# Patient Record
Sex: FEMALE | Race: WHITE | NOT HISPANIC OR LATINO | URBAN - METROPOLITAN AREA
[De-identification: names, ages, dates, MRNs, and addresses within clinical notes are randomized per-mention and may not be internally consistent; named-entity substitution may affect disease eponyms.]

---

## 2019-12-16 ENCOUNTER — OUTPATIENT (OUTPATIENT)
Dept: INPATIENT UNIT | Facility: HOSPITAL | Age: 29
LOS: 1 days | End: 2019-12-16
Payer: MEDICAID

## 2019-12-16 VITALS
SYSTOLIC BLOOD PRESSURE: 106 MMHG | DIASTOLIC BLOOD PRESSURE: 51 MMHG | TEMPERATURE: 99 F | RESPIRATION RATE: 18 BRPM | HEART RATE: 93 BPM

## 2019-12-16 VITALS — SYSTOLIC BLOOD PRESSURE: 115 MMHG | HEART RATE: 85 BPM | DIASTOLIC BLOOD PRESSURE: 63 MMHG

## 2019-12-16 DIAGNOSIS — O47.03 FALSE LABOR BEFORE 37 COMPLETED WEEKS OF GESTATION, THIRD TRIMESTER: ICD-10-CM

## 2019-12-16 LAB
APPEARANCE UR: CLEAR — SIGNIFICANT CHANGE UP
BILIRUB UR-MCNC: NEGATIVE — SIGNIFICANT CHANGE UP
COD CRY URNS QL: ABNORMAL
COLOR SPEC: YELLOW — SIGNIFICANT CHANGE UP
DIFF PNL FLD: NEGATIVE — SIGNIFICANT CHANGE UP
EPI CELLS # UR: SIGNIFICANT CHANGE UP
GLUCOSE UR QL: NEGATIVE MG/DL — SIGNIFICANT CHANGE UP
KETONES UR-MCNC: ABNORMAL
LEUKOCYTE ESTERASE UR-ACNC: ABNORMAL
NITRITE UR-MCNC: NEGATIVE — SIGNIFICANT CHANGE UP
PH UR: 6 — SIGNIFICANT CHANGE UP (ref 5–8)
PROT UR-MCNC: 30 MG/DL
RBC CASTS # UR COMP ASSIST: SIGNIFICANT CHANGE UP /HPF (ref 0–4)
SP GR SPEC: 1.02 — SIGNIFICANT CHANGE UP (ref 1.01–1.02)
UROBILINOGEN FLD QL: NEGATIVE MG/DL — SIGNIFICANT CHANGE UP
WBC UR QL: SIGNIFICANT CHANGE UP

## 2019-12-16 PROCEDURE — 87186 SC STD MICRODIL/AGAR DIL: CPT

## 2019-12-16 PROCEDURE — G0463: CPT

## 2019-12-16 PROCEDURE — 81001 URINALYSIS AUTO W/SCOPE: CPT

## 2019-12-16 PROCEDURE — 87086 URINE CULTURE/COLONY COUNT: CPT

## 2019-12-16 PROCEDURE — 59025 FETAL NON-STRESS TEST: CPT

## 2019-12-16 RX ORDER — SODIUM CHLORIDE 9 MG/ML
1000 INJECTION, SOLUTION INTRAVENOUS ONCE
Refills: 0 | Status: DISCONTINUED | OUTPATIENT
Start: 2019-12-16 | End: 2019-12-16

## 2019-12-16 NOTE — OB RN TRIAGE NOTE - NS_MATERNALCONCERNS_OBGYN_ALL_OB_FT
top x1 (2007)  asthma - inhaler prn - last used 6 mos ago  cyclic vomitting syndrome - took reglan daily until 24 wks gestation  marijuana use - 2-3x weekly

## 2019-12-16 NOTE — OB PROVIDER TRIAGE NOTE - HISTORY OF PRESENT ILLNESS
INDERJIT ANNE is a 29y  at 29w who presented to L&D because of some cramping for the past couple of days. She stated that she is here on vacation and has had frequent intercourse within the past few days. She stated after intercourse she had some light vaginal spotting and intermittent cramping. She denies any leakage of fluid and reports good fetal movement. This pregnancy has otherwise been uncomplicated.     obhx: top   pmhx: none  pshx: none  meds: pnv  Allergies: amoxicillin (Anaphylaxis), Cipro (Rash), Omnicef (Rash), penicillin (Anaphylaxis), Zofran (Vomiting)

## 2019-12-16 NOTE — OB PROVIDER TRIAGE NOTE - NSOBPROVIDERNOTE_OBGYN_ALL_OB_FT
DAYANA INDERJIT is a 29y  at 29w who presented to L&D for rule out  labor. Patient does not have regular contractions and is closed on vaginal exam. Urinalysis was clean. Due to frequent intercourse, patient did not have an FFN completed.    Plan  - f/u urinalysis- clean  - f/u outpatient, return to L&D if leakage of fluid, vaginal bleeding or decreased fetal movement    d/w [place]

## 2019-12-16 NOTE — OB PROVIDER TRIAGE NOTE - NSHPPHYSICALEXAM_GEN_ALL_CORE
Vital Signs Last 24 Hrs  T(C): 37 (16 Dec 2019 19:54), Max: 37.0 (16 Dec 2019 19:34)  T(F): 98.6 (16 Dec 2019 19:54), Max: 98.6 (16 Dec 2019 19:34)  HR: 93 (16 Dec 2019 19:54) (93 - 93)  BP: 106/51 (16 Dec 2019 19:54) (106/51 - 106/51)  RR: 18 (16 Dec 2019 19:54) (18 - 18)  SpO2: 100% (16 Dec 2019 19:54) (100% - 100%)  General: Alert and oriented x3, no acute distress  FHT: baseline 135bpm, moderate variability, +accels, -deccels  [place]: no contractions, mild uterine irritability  SVE: 0/0/-3  SSE: no active bleeding, normal vaginal discharge

## 2020-03-02 ENCOUNTER — OUTPATIENT (OUTPATIENT)
Dept: INPATIENT UNIT | Facility: HOSPITAL | Age: 30
LOS: 1 days | End: 2020-03-02
Payer: MEDICAID

## 2020-03-02 VITALS
RESPIRATION RATE: 16 BRPM | DIASTOLIC BLOOD PRESSURE: 59 MMHG | SYSTOLIC BLOOD PRESSURE: 103 MMHG | HEART RATE: 88 BPM | TEMPERATURE: 98 F

## 2020-03-02 VITALS — RESPIRATION RATE: 16 BRPM | TEMPERATURE: 98 F

## 2020-03-02 DIAGNOSIS — O47.1 FALSE LABOR AT OR AFTER 37 COMPLETED WEEKS OF GESTATION: ICD-10-CM

## 2020-03-02 DIAGNOSIS — Z98.890 OTHER SPECIFIED POSTPROCEDURAL STATES: Chronic | ICD-10-CM

## 2020-03-02 LAB
ALBUMIN SERPL ELPH-MCNC: 3.3 G/DL — SIGNIFICANT CHANGE UP (ref 3.3–5.2)
ALP SERPL-CCNC: 134 U/L — HIGH (ref 40–120)
ALT FLD-CCNC: 9 U/L — SIGNIFICANT CHANGE UP
AMPHET UR-MCNC: NEGATIVE — SIGNIFICANT CHANGE UP
ANION GAP SERPL CALC-SCNC: 15 MMOL/L — SIGNIFICANT CHANGE UP (ref 5–17)
APPEARANCE UR: CLEAR — SIGNIFICANT CHANGE UP
AST SERPL-CCNC: 16 U/L — SIGNIFICANT CHANGE UP
BACTERIA # UR AUTO: ABNORMAL
BARBITURATES UR SCN-MCNC: NEGATIVE — SIGNIFICANT CHANGE UP
BENZODIAZ UR-MCNC: NEGATIVE — SIGNIFICANT CHANGE UP
BILIRUB SERPL-MCNC: <0.2 MG/DL — LOW (ref 0.4–2)
BILIRUB UR-MCNC: NEGATIVE — SIGNIFICANT CHANGE UP
BLD GP AB SCN SERPL QL: SIGNIFICANT CHANGE UP
BUN SERPL-MCNC: 8 MG/DL — SIGNIFICANT CHANGE UP (ref 8–20)
CALCIUM SERPL-MCNC: 9 MG/DL — SIGNIFICANT CHANGE UP (ref 8.6–10.2)
CHLORIDE SERPL-SCNC: 100 MMOL/L — SIGNIFICANT CHANGE UP (ref 98–107)
CO2 SERPL-SCNC: 21 MMOL/L — LOW (ref 22–29)
COCAINE METAB.OTHER UR-MCNC: NEGATIVE — SIGNIFICANT CHANGE UP
COD CRY URNS QL: ABNORMAL
COLOR SPEC: YELLOW — SIGNIFICANT CHANGE UP
CREAT SERPL-MCNC: 0.82 MG/DL — SIGNIFICANT CHANGE UP (ref 0.5–1.3)
DIFF PNL FLD: ABNORMAL
EPI CELLS # UR: SIGNIFICANT CHANGE UP
GLUCOSE SERPL-MCNC: 80 MG/DL — SIGNIFICANT CHANGE UP (ref 70–99)
GLUCOSE UR QL: NEGATIVE MG/DL — SIGNIFICANT CHANGE UP
HCT VFR BLD CALC: 35.7 % — SIGNIFICANT CHANGE UP (ref 34.5–45)
HGB BLD-MCNC: 11.8 G/DL — SIGNIFICANT CHANGE UP (ref 11.5–15.5)
HIV 1 & 2 AB SERPL IA.RAPID: SIGNIFICANT CHANGE UP
KETONES UR-MCNC: ABNORMAL
LEUKOCYTE ESTERASE UR-ACNC: ABNORMAL
MCHC RBC-ENTMCNC: 27.7 PG — SIGNIFICANT CHANGE UP (ref 27–34)
MCHC RBC-ENTMCNC: 33.1 GM/DL — SIGNIFICANT CHANGE UP (ref 32–36)
MCV RBC AUTO: 83.8 FL — SIGNIFICANT CHANGE UP (ref 80–100)
METHADONE UR-MCNC: NEGATIVE — SIGNIFICANT CHANGE UP
NITRITE UR-MCNC: NEGATIVE — SIGNIFICANT CHANGE UP
OPIATES UR-MCNC: NEGATIVE — SIGNIFICANT CHANGE UP
PCP SPEC-MCNC: SIGNIFICANT CHANGE UP
PCP UR-MCNC: NEGATIVE — SIGNIFICANT CHANGE UP
PH UR: 6.5 — SIGNIFICANT CHANGE UP (ref 5–8)
PLATELET # BLD AUTO: 224 K/UL — SIGNIFICANT CHANGE UP (ref 150–400)
POTASSIUM SERPL-MCNC: 3.6 MMOL/L — SIGNIFICANT CHANGE UP (ref 3.5–5.3)
POTASSIUM SERPL-SCNC: 3.6 MMOL/L — SIGNIFICANT CHANGE UP (ref 3.5–5.3)
PROT SERPL-MCNC: 6.5 G/DL — LOW (ref 6.6–8.7)
PROT UR-MCNC: 30 MG/DL
RBC # BLD: 4.26 M/UL — SIGNIFICANT CHANGE UP (ref 3.8–5.2)
RBC # FLD: 15 % — HIGH (ref 10.3–14.5)
RBC CASTS # UR COMP ASSIST: SIGNIFICANT CHANGE UP /HPF (ref 0–4)
SODIUM SERPL-SCNC: 136 MMOL/L — SIGNIFICANT CHANGE UP (ref 135–145)
SP GR SPEC: 1.02 — SIGNIFICANT CHANGE UP (ref 1.01–1.02)
THC UR QL: POSITIVE
UROBILINOGEN FLD QL: 1 MG/DL
WBC # BLD: 11.27 K/UL — HIGH (ref 3.8–10.5)
WBC # FLD AUTO: 11.27 K/UL — HIGH (ref 3.8–10.5)
WBC UR QL: SIGNIFICANT CHANGE UP

## 2020-03-02 PROCEDURE — 86765 RUBEOLA ANTIBODY: CPT

## 2020-03-02 PROCEDURE — 81001 URINALYSIS AUTO W/SCOPE: CPT

## 2020-03-02 PROCEDURE — 87340 HEPATITIS B SURFACE AG IA: CPT

## 2020-03-02 PROCEDURE — 87389 HIV-1 AG W/HIV-1&-2 AB AG IA: CPT

## 2020-03-02 PROCEDURE — 87081 CULTURE SCREEN ONLY: CPT

## 2020-03-02 PROCEDURE — 86703 HIV-1/HIV-2 1 RESULT ANTBDY: CPT

## 2020-03-02 PROCEDURE — 86780 TREPONEMA PALLIDUM: CPT

## 2020-03-02 PROCEDURE — 80053 COMPREHEN METABOLIC PANEL: CPT

## 2020-03-02 PROCEDURE — 85027 COMPLETE CBC AUTOMATED: CPT

## 2020-03-02 PROCEDURE — 87591 N.GONORRHOEAE DNA AMP PROB: CPT

## 2020-03-02 PROCEDURE — 87800 DETECT AGNT MULT DNA DIREC: CPT

## 2020-03-02 PROCEDURE — 36415 COLL VENOUS BLD VENIPUNCTURE: CPT

## 2020-03-02 PROCEDURE — 86901 BLOOD TYPING SEROLOGIC RH(D): CPT

## 2020-03-02 PROCEDURE — 86850 RBC ANTIBODY SCREEN: CPT

## 2020-03-02 PROCEDURE — 80307 DRUG TEST PRSMV CHEM ANLYZR: CPT

## 2020-03-02 PROCEDURE — 59025 FETAL NON-STRESS TEST: CPT

## 2020-03-02 PROCEDURE — 86762 RUBELLA ANTIBODY: CPT

## 2020-03-02 PROCEDURE — 86900 BLOOD TYPING SEROLOGIC ABO: CPT

## 2020-03-02 PROCEDURE — 87491 CHLMYD TRACH DNA AMP PROBE: CPT

## 2020-03-02 PROCEDURE — G0463: CPT

## 2020-03-02 PROCEDURE — 76818 FETAL BIOPHYS PROFILE W/NST: CPT

## 2020-03-02 RX ORDER — SODIUM CHLORIDE 9 MG/ML
1000 INJECTION, SOLUTION INTRAVENOUS
Refills: 0 | Status: DISCONTINUED | OUTPATIENT
Start: 2020-03-02 | End: 2020-03-17

## 2020-03-02 RX ORDER — SODIUM CHLORIDE 9 MG/ML
1000 INJECTION, SOLUTION INTRAVENOUS ONCE
Refills: 0 | Status: DISCONTINUED | OUTPATIENT
Start: 2020-03-02 | End: 2020-03-17

## 2020-03-02 RX ORDER — NITROFURANTOIN MACROCRYSTAL 50 MG
1 CAPSULE ORAL
Qty: 14 | Refills: 0
Start: 2020-03-02

## 2020-03-02 NOTE — OB PROVIDER TRIAGE NOTE - NSHPPHYSICALEXAM_GEN_ALL_CORE
General: Alert and oriented x3, NAD  Abd: Soft, nontender, gravid  SVE:  SSE:    FHT: 160bpm - category I tracing  [place]: Irregular ctxs

## 2020-03-02 NOTE — OB RN TRIAGE NOTE - PMH
Asthma, unspecified asthma severity, unspecified whether complicated, unspecified whether persistent    Cyclical vomiting

## 2020-03-02 NOTE — OB PROVIDER TRIAGE NOTE - HISTORY OF PRESENT ILLNESS
Patient is a 29yo  at 40w by 1st trimester sono presenting today because "it is my due date", vaginal discharge with itching, "I haven't had prenatal care since December". Itching started a week ago, she didn't notice any discharge until yesterday. The discharge is cottage cheese-like, she thinks she has a yeast infection. Denies VB, LOF, dysuria, hematuria.    Prenatal course complicated by irregular PNC. Patient said she had regular PNC in [place] until December when she came up here to be with a terminally ill relative. She has continued to live up here, however she has not scheduled a prenatal visit with any providers.     Medhx: Asthma - last hospitalized at 7yo, last used inhaler a year ago, cyclic vomiting syndrome  Surghx: none  Meds: Reglan prn  All: amoxicillin (Anaphylaxis), Cipro (Rash), Omnicef (Rash), penicillin (Anaphylaxis), Zofran (Vomiting)

## 2020-03-02 NOTE — OB PROVIDER TRIAGE NOTE - NSOBPROVIDERNOTE_OBGYN_ALL_OB_FT
Pt is 39.6 wks with limited prenatal care - came to LDR for ?yeast infection.  She denied any other complaints.  On exam, VSS, afebrile.  FHR tracing Category 1, with no contractions.   Cervix closed, no evidence of yeast infection.  Labs drawn C/W UTI and +THC in urine tox.  Reviewed findings with pt and Macrobid script given.  Pt does not plan to establish care with an Ob/Gyn and is now scheduled to return at 41 wks gestation for induction of labor.  Prenatal labs drawn now.  Pt did not receive Rhogam at 26-28wks - will need post partum.

## 2020-03-03 LAB
C TRACH RRNA SPEC QL NAA+PROBE: SIGNIFICANT CHANGE UP
CANDIDA AB TITR SER: DETECTED
G VAGINALIS DNA SPEC QL NAA+PROBE: DETECTED
HBV SURFACE AG SERPL QL IA: SIGNIFICANT CHANGE UP
HIV 1+2 AB+HIV1 P24 AG SERPL QL IA: SIGNIFICANT CHANGE UP
MEV IGG SER-ACNC: >300 AU/ML — SIGNIFICANT CHANGE UP
MEV IGG+IGM SER-IMP: POSITIVE — SIGNIFICANT CHANGE UP
N GONORRHOEA RRNA SPEC QL NAA+PROBE: SIGNIFICANT CHANGE UP
RUBV IGG SER-ACNC: 5.3 INDEX — SIGNIFICANT CHANGE UP
RUBV IGG SER-IMP: POSITIVE — SIGNIFICANT CHANGE UP
SPECIMEN SOURCE: SIGNIFICANT CHANGE UP
T PALLIDUM AB TITR SER: NEGATIVE — SIGNIFICANT CHANGE UP
T VAGINALIS SPEC QL WET PREP: SIGNIFICANT CHANGE UP

## 2020-03-04 ENCOUNTER — INPATIENT (INPATIENT)
Facility: HOSPITAL | Age: 30
LOS: 3 days | Discharge: HOME | End: 2020-03-08
Attending: SPECIALIST | Admitting: SPECIALIST
Payer: MEDICAID

## 2020-03-04 ENCOUNTER — TRANSCRIPTION ENCOUNTER (OUTPATIENT)
Age: 30
End: 2020-03-04

## 2020-03-04 DIAGNOSIS — O47.1 FALSE LABOR AT OR AFTER 37 COMPLETED WEEKS OF GESTATION: ICD-10-CM

## 2020-03-04 DIAGNOSIS — O26.893 OTHER SPECIFIED PREGNANCY RELATED CONDITIONS, THIRD TRIMESTER: ICD-10-CM

## 2020-03-04 DIAGNOSIS — Z98.890 OTHER SPECIFIED POSTPROCEDURAL STATES: Chronic | ICD-10-CM

## 2020-03-04 LAB
CULTURE RESULTS: SIGNIFICANT CHANGE UP
SPECIMEN SOURCE: SIGNIFICANT CHANGE UP

## 2020-03-05 PROBLEM — J45.909 UNSPECIFIED ASTHMA, UNCOMPLICATED: Chronic | Status: ACTIVE | Noted: 2020-03-02

## 2020-03-05 PROBLEM — R11.15 CYCLICAL VOMITING SYNDROME UNRELATED TO MIGRAINE: Chronic | Status: ACTIVE | Noted: 2020-03-02

## 2020-03-05 LAB
APPEARANCE UR: ABNORMAL
BASOPHILS # BLD AUTO: 0.04 K/UL — SIGNIFICANT CHANGE UP (ref 0–0.2)
BASOPHILS NFR BLD AUTO: 0.3 % — SIGNIFICANT CHANGE UP (ref 0–2)
BILIRUB UR-MCNC: NEGATIVE — SIGNIFICANT CHANGE UP
BLD GP AB SCN SERPL QL: SIGNIFICANT CHANGE UP
COLOR SPEC: YELLOW — SIGNIFICANT CHANGE UP
COMMENT - URINE: SIGNIFICANT CHANGE UP
DIFF PNL FLD: ABNORMAL
EOSINOPHIL # BLD AUTO: 0.07 K/UL — SIGNIFICANT CHANGE UP (ref 0–0.5)
EOSINOPHIL NFR BLD AUTO: 0.5 % — SIGNIFICANT CHANGE UP (ref 0–6)
EPI CELLS # UR: SIGNIFICANT CHANGE UP
GLUCOSE UR QL: NEGATIVE MG/DL — SIGNIFICANT CHANGE UP
HCT VFR BLD CALC: 36.7 % — SIGNIFICANT CHANGE UP (ref 34.5–45)
HGB BLD-MCNC: 11.9 G/DL — SIGNIFICANT CHANGE UP (ref 11.5–15.5)
IMM GRANULOCYTES NFR BLD AUTO: 0.7 % — SIGNIFICANT CHANGE UP (ref 0–1.5)
KETONES UR-MCNC: NEGATIVE — SIGNIFICANT CHANGE UP
LEUKOCYTE ESTERASE UR-ACNC: ABNORMAL
LYMPHOCYTES # BLD AUTO: 14.8 % — SIGNIFICANT CHANGE UP (ref 13–44)
LYMPHOCYTES # BLD AUTO: 2.03 K/UL — SIGNIFICANT CHANGE UP (ref 1–3.3)
MCHC RBC-ENTMCNC: 27.2 PG — SIGNIFICANT CHANGE UP (ref 27–34)
MCHC RBC-ENTMCNC: 32.4 GM/DL — SIGNIFICANT CHANGE UP (ref 32–36)
MCV RBC AUTO: 84 FL — SIGNIFICANT CHANGE UP (ref 80–100)
MONOCYTES # BLD AUTO: 0.94 K/UL — HIGH (ref 0–0.9)
MONOCYTES NFR BLD AUTO: 6.8 % — SIGNIFICANT CHANGE UP (ref 2–14)
NEUTROPHILS # BLD AUTO: 10.58 K/UL — HIGH (ref 1.8–7.4)
NEUTROPHILS NFR BLD AUTO: 76.9 % — SIGNIFICANT CHANGE UP (ref 43–77)
NITRITE UR-MCNC: NEGATIVE — SIGNIFICANT CHANGE UP
PH UR: 6.5 — SIGNIFICANT CHANGE UP (ref 5–8)
PLATELET # BLD AUTO: 229 K/UL — SIGNIFICANT CHANGE UP (ref 150–400)
PROT UR-MCNC: 30 MG/DL
RBC # BLD: 4.37 M/UL — SIGNIFICANT CHANGE UP (ref 3.8–5.2)
RBC # FLD: 14.8 % — HIGH (ref 10.3–14.5)
RBC CASTS # UR COMP ASSIST: ABNORMAL /HPF (ref 0–4)
SP GR SPEC: 1.02 — SIGNIFICANT CHANGE UP (ref 1.01–1.02)
T PALLIDUM AB TITR SER: NEGATIVE — SIGNIFICANT CHANGE UP
UROBILINOGEN FLD QL: NEGATIVE MG/DL — SIGNIFICANT CHANGE UP
WBC # BLD: 13.76 K/UL — HIGH (ref 3.8–10.5)
WBC # FLD AUTO: 13.76 K/UL — HIGH (ref 3.8–10.5)
WBC UR QL: ABNORMAL

## 2020-03-05 RX ORDER — SODIUM CHLORIDE 9 MG/ML
1000 INJECTION, SOLUTION INTRAVENOUS
Refills: 0 | Status: DISCONTINUED | OUTPATIENT
Start: 2020-03-05 | End: 2020-03-08

## 2020-03-05 RX ORDER — OXYTOCIN 10 UNIT/ML
2 VIAL (ML) INJECTION
Qty: 30 | Refills: 0 | Status: DISCONTINUED | OUTPATIENT
Start: 2020-03-05 | End: 2020-03-08

## 2020-03-05 RX ORDER — OXYTOCIN 10 UNIT/ML
4 VIAL (ML) INJECTION
Qty: 30 | Refills: 0 | Status: DISCONTINUED | OUTPATIENT
Start: 2020-03-05 | End: 2020-03-08

## 2020-03-05 RX ORDER — GENTAMICIN SULFATE 40 MG/ML
250 VIAL (ML) INJECTION ONCE
Refills: 0 | Status: COMPLETED | OUTPATIENT
Start: 2020-03-05 | End: 2020-03-05

## 2020-03-05 RX ORDER — FAMOTIDINE 10 MG/ML
20 INJECTION INTRAVENOUS ONCE
Refills: 0 | Status: COMPLETED | OUTPATIENT
Start: 2020-03-05 | End: 2020-03-05

## 2020-03-05 RX ORDER — OXYCODONE HYDROCHLORIDE 5 MG/1
5 TABLET ORAL ONCE
Refills: 0 | Status: DISCONTINUED | OUTPATIENT
Start: 2020-03-05 | End: 2020-03-08

## 2020-03-05 RX ORDER — SODIUM CHLORIDE 9 MG/ML
1000 INJECTION, SOLUTION INTRAVENOUS ONCE
Refills: 0 | Status: COMPLETED | OUTPATIENT
Start: 2020-03-05 | End: 2020-03-05

## 2020-03-05 RX ORDER — DEXAMETHASONE 0.5 MG/5ML
4 ELIXIR ORAL EVERY 6 HOURS
Refills: 0 | Status: DISCONTINUED | OUTPATIENT
Start: 2020-03-05 | End: 2020-03-08

## 2020-03-05 RX ORDER — MAGNESIUM HYDROXIDE 400 MG/1
30 TABLET, CHEWABLE ORAL
Refills: 0 | Status: DISCONTINUED | OUTPATIENT
Start: 2020-03-05 | End: 2020-03-08

## 2020-03-05 RX ORDER — CITRIC ACID/SODIUM CITRATE 300-500 MG
30 SOLUTION, ORAL ORAL ONCE
Refills: 0 | Status: COMPLETED | OUTPATIENT
Start: 2020-03-05 | End: 2020-03-05

## 2020-03-05 RX ORDER — SODIUM CHLORIDE 9 MG/ML
1000 INJECTION, SOLUTION INTRAVENOUS
Refills: 0 | Status: DISCONTINUED | OUTPATIENT
Start: 2020-03-04 | End: 2020-03-05

## 2020-03-05 RX ORDER — SIMETHICONE 80 MG/1
80 TABLET, CHEWABLE ORAL EVERY 4 HOURS
Refills: 0 | Status: DISCONTINUED | OUTPATIENT
Start: 2020-03-05 | End: 2020-03-08

## 2020-03-05 RX ORDER — TETANUS TOXOID, REDUCED DIPHTHERIA TOXOID AND ACELLULAR PERTUSSIS VACCINE, ADSORBED 5; 2.5; 8; 8; 2.5 [IU]/.5ML; [IU]/.5ML; UG/.5ML; UG/.5ML; UG/.5ML
0.5 SUSPENSION INTRAMUSCULAR ONCE
Refills: 0 | Status: DISCONTINUED | OUTPATIENT
Start: 2020-03-05 | End: 2020-03-08

## 2020-03-05 RX ORDER — NALOXONE HYDROCHLORIDE 4 MG/.1ML
0.1 SPRAY NASAL
Refills: 0 | Status: DISCONTINUED | OUTPATIENT
Start: 2020-03-05 | End: 2020-03-08

## 2020-03-05 RX ORDER — IBUPROFEN 200 MG
600 TABLET ORAL EVERY 6 HOURS
Refills: 0 | Status: COMPLETED | OUTPATIENT
Start: 2020-03-05 | End: 2021-02-01

## 2020-03-05 RX ORDER — LANOLIN
1 OINTMENT (GRAM) TOPICAL EVERY 6 HOURS
Refills: 0 | Status: DISCONTINUED | OUTPATIENT
Start: 2020-03-05 | End: 2020-03-08

## 2020-03-05 RX ORDER — GLYCERIN ADULT
1 SUPPOSITORY, RECTAL RECTAL AT BEDTIME
Refills: 0 | Status: DISCONTINUED | OUTPATIENT
Start: 2020-03-05 | End: 2020-03-08

## 2020-03-05 RX ORDER — DIPHENHYDRAMINE HCL 50 MG
25 CAPSULE ORAL EVERY 6 HOURS
Refills: 0 | Status: DISCONTINUED | OUTPATIENT
Start: 2020-03-05 | End: 2020-03-08

## 2020-03-05 RX ORDER — ACETAMINOPHEN 500 MG
975 TABLET ORAL
Refills: 0 | Status: DISCONTINUED | OUTPATIENT
Start: 2020-03-05 | End: 2020-03-08

## 2020-03-05 RX ORDER — ONDANSETRON 8 MG/1
4 TABLET, FILM COATED ORAL EVERY 6 HOURS
Refills: 0 | Status: DISCONTINUED | OUTPATIENT
Start: 2020-03-05 | End: 2020-03-08

## 2020-03-05 RX ORDER — METRONIDAZOLE 500 MG
500 TABLET ORAL EVERY 12 HOURS
Refills: 0 | Status: DISCONTINUED | OUTPATIENT
Start: 2020-03-05 | End: 2020-03-08

## 2020-03-05 RX ORDER — OXYTOCIN 10 UNIT/ML
333.33 VIAL (ML) INJECTION
Qty: 20 | Refills: 0 | Status: DISCONTINUED | OUTPATIENT
Start: 2020-03-05 | End: 2020-03-08

## 2020-03-05 RX ORDER — OXYTOCIN 10 UNIT/ML
333.33 VIAL (ML) INJECTION
Qty: 20 | Refills: 0 | Status: DISCONTINUED | OUTPATIENT
Start: 2020-03-04 | End: 2020-03-08

## 2020-03-05 RX ORDER — OXYCODONE HYDROCHLORIDE 5 MG/1
5 TABLET ORAL
Refills: 0 | Status: COMPLETED | OUTPATIENT
Start: 2020-03-05 | End: 2020-03-12

## 2020-03-05 RX ORDER — CITRIC ACID/SODIUM CITRATE 300-500 MG
30 SOLUTION, ORAL ORAL ONCE
Refills: 0 | Status: COMPLETED | OUTPATIENT
Start: 2020-03-04 | End: 2020-03-05

## 2020-03-05 RX ORDER — DIPHENHYDRAMINE HCL 50 MG
50 CAPSULE ORAL ONCE
Refills: 0 | Status: COMPLETED | OUTPATIENT
Start: 2020-03-05 | End: 2020-03-05

## 2020-03-05 RX ORDER — ACETAMINOPHEN 500 MG
1000 TABLET ORAL ONCE
Refills: 0 | Status: COMPLETED | OUTPATIENT
Start: 2020-03-05 | End: 2020-03-05

## 2020-03-05 RX ORDER — KETOROLAC TROMETHAMINE 30 MG/ML
30 SYRINGE (ML) INJECTION EVERY 6 HOURS
Refills: 0 | Status: DISCONTINUED | OUTPATIENT
Start: 2020-03-05 | End: 2020-03-06

## 2020-03-05 RX ADMIN — Medication 30 MILLIGRAM(S): at 15:45

## 2020-03-05 RX ADMIN — SODIUM CHLORIDE 125 MILLILITER(S): 9 INJECTION, SOLUTION INTRAVENOUS at 08:34

## 2020-03-05 RX ADMIN — Medication 1000 MILLIGRAM(S): at 15:44

## 2020-03-05 RX ADMIN — Medication 30 MILLIGRAM(S): at 22:59

## 2020-03-05 RX ADMIN — Medication 250 MILLIGRAM(S): at 13:44

## 2020-03-05 RX ADMIN — FAMOTIDINE 20 MILLIGRAM(S): 10 INJECTION INTRAVENOUS at 12:01

## 2020-03-05 RX ADMIN — Medication 2 MILLIUNIT(S)/MIN: at 07:12

## 2020-03-05 RX ADMIN — Medication 4 MILLIUNIT(S)/MIN: at 13:43

## 2020-03-05 RX ADMIN — Medication 30 MILLIGRAM(S): at 21:59

## 2020-03-05 RX ADMIN — Medication 0.25 MILLIGRAM(S): at 03:01

## 2020-03-05 RX ADMIN — SODIUM CHLORIDE 1000 MILLILITER(S): 9 INJECTION, SOLUTION INTRAVENOUS at 11:30

## 2020-03-05 RX ADMIN — Medication 2 MILLIUNIT(S)/MIN: at 08:33

## 2020-03-05 RX ADMIN — Medication 30 MILLILITER(S): at 11:30

## 2020-03-05 RX ADMIN — Medication 400 MILLIGRAM(S): at 15:15

## 2020-03-05 RX ADMIN — Medication 100 MILLIGRAM(S): at 21:58

## 2020-03-05 RX ADMIN — Medication 4 MILLIGRAM(S): at 16:25

## 2020-03-05 RX ADMIN — SODIUM CHLORIDE 125 MILLILITER(S): 9 INJECTION, SOLUTION INTRAVENOUS at 01:00

## 2020-03-05 RX ADMIN — SODIUM CHLORIDE 125 MILLILITER(S): 9 INJECTION, SOLUTION INTRAVENOUS at 07:12

## 2020-03-05 RX ADMIN — Medication 30 MILLIGRAM(S): at 16:00

## 2020-03-05 RX ADMIN — Medication 500 MILLIGRAM(S): at 18:16

## 2020-03-05 RX ADMIN — SODIUM CHLORIDE 125 MILLILITER(S): 9 INJECTION, SOLUTION INTRAVENOUS at 01:55

## 2020-03-05 RX ADMIN — Medication 100 MILLIGRAM(S): at 13:24

## 2020-03-05 RX ADMIN — Medication 30 MILLILITER(S): at 00:30

## 2020-03-05 RX ADMIN — Medication 50 MILLIGRAM(S): at 02:50

## 2020-03-05 RX ADMIN — SODIUM CHLORIDE 1000 MILLILITER(S): 9 INJECTION, SOLUTION INTRAVENOUS at 00:00

## 2020-03-05 NOTE — CHART NOTE - NSCHARTNOTEFT_GEN_A_CORE
Seen at bedside for 2 minute deceleration with good recovery.   She was checked and found to be the same as her last exam, 5/100/-2  Pitocin turned off    Vital Signs Last 24 Hrs  T(C): 37.0 (05 Mar 2020 09:03), Max: 37.2 (05 Mar 2020 06:45)  T(F): 98.6 (05 Mar 2020 09:03), Max: 98.96 (05 Mar 2020 06:45)  HR: 90 (05 Mar 2020 09:43) (75 - 142)  BP: 125/61 (05 Mar 2020 09:43) (103/53 - 144/78)  RR: 18 (05 Mar 2020 09:03) (16 - 20)  SpO2: 98% (05 Mar 2020 08:37) (93% - 100%)    FHT: 145bpm - category I tracing  [place]: Ctxs every 1-3min    Hold pitocin for now, restart if category I

## 2020-03-05 NOTE — CHART NOTE - NSCHARTNOTEFT_GEN_A_CORE
Pt was seen and examined at bedside, she is comfortable with epidural anesthesia   Vital Signs Last 24 Hrs  T(C): 37.1 (05 Mar 2020 04:29), Max: 37.1 (05 Mar 2020 01:43)  T(F): 98.78 (05 Mar 2020 04:29), Max: 98.8 (05 Mar 2020 01:43)  HR: 109 (05 Mar 2020 06:27) (75 - 142)  BP: 113/56 (05 Mar 2020 06:15) (103/53 - 136/63)  RR: 18 (05 Mar 2020 04:29) (16 - 20)  SpO2: 94% (05 Mar 2020 06:25) (94% - 100%)    FETAL HEART RATE: 140, moderate variability, + accelerations, no decelerations    [place]: contractions every 5 min    CERVICAL EXAM: 5/100/-1 vertex, clear fluids, the IUPC fell off and was replaced    PAIN SCALE (0-10): effective epidural anesthesia    IMPRESSION: Pt recovered well from initial tachysystole episode with cat 1 tracing, restarted on pitocin,     INTERVENTIONS: continue with current regimen, increase the pitocin until 3 contractions q10m  reassess as indicated

## 2020-03-05 NOTE — OB NEONATOLOGY/PEDIATRICIAN DELIVERY SUMMARY - NSPEDSNEONOTESA_OBGYN_ALL_OB_FT
Lacy Sanchez requested me to attend P C/S at 40.3 wks due to FTD. The mother is 29 y/o, , A Neg, Rhogam at 12wks (early), RI, GBS, HIV, RPR & HBsAg are NR  L & D: Clear fluid, suctioned and dried,   Asst: full term appropriate for gestational age, BB, P C/S  Plan: Observe in transition, if stable admit to NBN Lacy Sanchez requested me to attend P C/S at 40.3 wks due to FTD. The mother is 29 y/o, , A Neg, Rhogam at 12wks (early), RI, GBS, HIV, RPR & HBsAg are NR. SROM @21:00 on 3/4/20  L & D: Clear fluid, suctioned and dried,   Asst: full term appropriate for gestational age, BB, P C/S  Plan: Observe in transition, if stable admit to NBN Lacy Sanchez requested me to attend P C/S at 40.3 wks due to FTD. The mother is 31 y/o, , A Neg, Rhogam at 12wks (early), RI, GBS, HIV, RPR & HBsAg are NR. SROM @21:00 on 3/4/20  L & D: Clear fluid, OP presentation, suctioned and dried, voided, BW: 3200gmm (7-1lb)  Asst: full term appropriate for gestational age, BB, P C/S  Plan: Observe in transition, if stable admit to NBN

## 2020-03-05 NOTE — CHART NOTE - NSCHARTNOTEFT_GEN_A_CORE
MD called to bedside for deceleration down to the 60s shortly after starting pitocin and immediately following a titanic contraction x 4 minutes.   She was immediately placed on O2, IV bolus given, and she was positioned to all 4s. OR was opened in case of emergent c/s.   Terbutaline was given after approximately 3.5 minutes and fetal scalp stimulation was performed with good response.   Fetus recovered now with a baseline of 150s and moderate variability.   Dr. Farris at bedside.   All of this explained to patient and .

## 2020-03-05 NOTE — OB PROVIDER DELIVERY SUMMARY - NSPROVIDERDELIVERYNOTE_OBGYN_ALL_OB_FT
After pt examined and cervix unchanged (4cm, was 4cm on admission >12 hours prior) with poor fetal tolerance of pitocin augmentation, decision made to proceed with primary C/S.  C/S performed by Dr MOI Sanchez, assist Dr KALLI Pereira, PGY-2 under epi-spinal anesthesia.  Sterils pannus retractor used due to maternal morbid obesity.  Delivered live male infant, OP, asynclytic through clear amniotic fluid.   weight 7lb 0oz, 3200g, APGAR 9/9.  Uterus, tubes, ovaries WNL but pelvis very deep.  No intraoperative complications.  EBL 800cc, UO clear, 150cc.  Skin-to-skin initiated in OR and continued into RR.  Baby later admitted to [facility].

## 2020-03-05 NOTE — OB RN DELIVERY SUMMARY - NS_SEPSISRSKCALC_OBGYN_ALL_OB_FT
EOS calculated successfully. EOS Risk Factor: 0.5/1000 live births ([facility] national incidence); GA=40w2d; Temp=98.96; ROM=16.717; GBS='Negative'; Antibiotics='No antibiotics or any antibiotics < 2 hrs prior to birth'

## 2020-03-05 NOTE — OB PROVIDER H&P - HISTORY OF PRESENT ILLNESS
INDERJIT ANNE is a 30y  at 40w2d who presented to L&D after feeling like she broke her water. She stated that she was also having contractions every 6 minutes. Patient denies any vaginal bleeding, or blood in the urine and reports good fetal movement. Pregnancy was complicated by late to care in [place]. She recently moved from [place] to be with a sick relative. In [place], she had regular care, however she did not have an OBGYN in [place].   Medhx: Asthma - last hospitalized at 7yo, last used inhaler a year ago, cyclic vomiting syndrome  Surghx: none  Meds: Reglan prn  All: amoxicillin (Anaphylaxis), Cipro (Rash), Omnicef (Rash), penicillin (Anaphylaxis), Zofran (Vomiting)

## 2020-03-05 NOTE — OB PROVIDER IHI INDUCTION/AUGMENTATION NOTE - NS_CHECKALL_OBGYN_ALL_OB
Contractions pattern was reviewed/Induction / Augmentation was discussed/Order was written/FHR was reviewed/H&P was completed

## 2020-03-05 NOTE — OB RN INTRAOPERATIVE NOTE - NS_UTERINEINCISION_OBGYN_ALL_OB_DT
05-Mar-2020 13:38 For information on Fall & Injury Prevention, visit [email or web address]

## 2020-03-05 NOTE — OB PROVIDER H&P - NSHPPHYSICALEXAM_GEN_ALL_CORE
Vital Signs Last 24 Hrs  HR: 98 (04 Mar 2020 23:57) (98 - 98)  BP: 112/61 (04 Mar 2020 23:57) (112/61 - 115/50)    General: Alert and oriented x3, no acute distress  Cardiovascular: regular rate and rhythm, no murmurs, rubs or gallops appreciated on exam  Respiratory: clear to auscultation bilaterally  Abdominal: gravid uterus, non-tender to palpation  Pelvic: 4/80/-2  Extremities: no redness, tenderness or swelling in lower extremities bilaterally   FHT: baseline 135bpm, moderate variability, +accels, -deccels  [place]: irregular contractions q6 minutes  Ultrasound: vertex presentation Vital Signs Last 24 Hrs  HR: 98 (04 Mar 2020 23:57) (98 - 98)  BP: 112/61 (04 Mar 2020 23:57) (112/61 - 115/50)  BMI 46    General: Alert and oriented x3, no acute distress  Cardiovascular: regular rate and rhythm, no murmurs, rubs or gallops appreciated on exam  Respiratory: clear to auscultation bilaterally  Abdominal: gravid uterus, non-tender to palpation  Pelvic: 4/80/-2  Extremities: no redness, tenderness or swelling in lower extremities bilaterally   FHT: baseline 135bpm, moderate variability, +accels, -deccels  [place]: irregular contractions q6 minutes  Ultrasound: vertex presentation

## 2020-03-05 NOTE — CHART NOTE - NSCHARTNOTEFT_GEN_A_CORE
Patient received an epidural and is comfortable at this time.    Vital Signs Last 24 Hrs  T(C): 36.7 (04 Mar 2020 23:53), Max: 36.7 (04 Mar 2020 23:53)  T(F): 98.1 (04 Mar 2020 23:53), Max: 98.1 (04 Mar 2020 23:53)  HR: 85 (05 Mar 2020 01:10) (85 - 110)  BP: 112/70 (05 Mar 2020 01:10) (112/61 - 131/63)  RR: 20 (04 Mar 2020 23:53) (20 - 20)  SpO2: 97% (05 Mar 2020 00:45) (97% - 99%)    SVE: deferred  FHT: baseline 140bpm, minimal variability, -accels, -deccels  [place]: irregular contractions q6-7 minutes    a/p  Patient is comfortable with epidural in place.     - D5LR, O2 for minimal variability

## 2020-03-06 LAB
BASOPHILS # BLD AUTO: 0.04 K/UL — SIGNIFICANT CHANGE UP (ref 0–0.2)
BASOPHILS NFR BLD AUTO: 0.3 % — SIGNIFICANT CHANGE UP (ref 0–2)
EOSINOPHIL # BLD AUTO: 0.03 K/UL — SIGNIFICANT CHANGE UP (ref 0–0.5)
EOSINOPHIL NFR BLD AUTO: 0.2 % — SIGNIFICANT CHANGE UP (ref 0–6)
HCT VFR BLD CALC: 26.5 % — LOW (ref 34.5–45)
HGB BLD-MCNC: 8.4 G/DL — LOW (ref 11.5–15.5)
IMM GRANULOCYTES NFR BLD AUTO: 0.5 % — SIGNIFICANT CHANGE UP (ref 0–1.5)
LYMPHOCYTES # BLD AUTO: 14.4 % — SIGNIFICANT CHANGE UP (ref 13–44)
LYMPHOCYTES # BLD AUTO: 2.09 K/UL — SIGNIFICANT CHANGE UP (ref 1–3.3)
MCHC RBC-ENTMCNC: 27.6 PG — SIGNIFICANT CHANGE UP (ref 27–34)
MCHC RBC-ENTMCNC: 31.7 GM/DL — LOW (ref 32–36)
MCV RBC AUTO: 87.2 FL — SIGNIFICANT CHANGE UP (ref 80–100)
MONOCYTES # BLD AUTO: 0.96 K/UL — HIGH (ref 0–0.9)
MONOCYTES NFR BLD AUTO: 6.6 % — SIGNIFICANT CHANGE UP (ref 2–14)
NEUTROPHILS # BLD AUTO: 11.36 K/UL — HIGH (ref 1.8–7.4)
NEUTROPHILS NFR BLD AUTO: 78 % — HIGH (ref 43–77)
PLATELET # BLD AUTO: 166 K/UL — SIGNIFICANT CHANGE UP (ref 150–400)
RBC # BLD: 3.04 M/UL — LOW (ref 3.8–5.2)
RBC # FLD: 15.3 % — HIGH (ref 10.3–14.5)
WBC # BLD: 14.56 K/UL — HIGH (ref 3.8–10.5)
WBC # FLD AUTO: 14.56 K/UL — HIGH (ref 3.8–10.5)

## 2020-03-06 RX ORDER — ENOXAPARIN SODIUM 100 MG/ML
40 INJECTION SUBCUTANEOUS DAILY
Refills: 0 | Status: DISCONTINUED | OUTPATIENT
Start: 2020-03-06 | End: 2020-03-08

## 2020-03-06 RX ORDER — OXYCODONE HYDROCHLORIDE 5 MG/1
5 TABLET ORAL ONCE
Refills: 0 | Status: DISCONTINUED | OUTPATIENT
Start: 2020-03-06 | End: 2020-03-06

## 2020-03-06 RX ORDER — IBUPROFEN 200 MG
600 TABLET ORAL EVERY 6 HOURS
Refills: 0 | Status: DISCONTINUED | OUTPATIENT
Start: 2020-03-06 | End: 2020-03-08

## 2020-03-06 RX ORDER — OXYCODONE HYDROCHLORIDE 5 MG/1
5 TABLET ORAL
Refills: 0 | Status: DISCONTINUED | OUTPATIENT
Start: 2020-03-06 | End: 2020-03-08

## 2020-03-06 RX ADMIN — Medication 975 MILLIGRAM(S): at 10:17

## 2020-03-06 RX ADMIN — OXYCODONE HYDROCHLORIDE 5 MILLIGRAM(S): 5 TABLET ORAL at 09:17

## 2020-03-06 RX ADMIN — OXYCODONE HYDROCHLORIDE 5 MILLIGRAM(S): 5 TABLET ORAL at 20:30

## 2020-03-06 RX ADMIN — Medication 25 MILLIGRAM(S): at 05:33

## 2020-03-06 RX ADMIN — Medication 975 MILLIGRAM(S): at 16:31

## 2020-03-06 RX ADMIN — Medication 500 MILLIGRAM(S): at 18:24

## 2020-03-06 RX ADMIN — Medication 600 MILLIGRAM(S): at 23:10

## 2020-03-06 RX ADMIN — Medication 975 MILLIGRAM(S): at 15:31

## 2020-03-06 RX ADMIN — ENOXAPARIN SODIUM 40 MILLIGRAM(S): 100 INJECTION SUBCUTANEOUS at 11:36

## 2020-03-06 RX ADMIN — Medication 30 MILLIGRAM(S): at 05:26

## 2020-03-06 RX ADMIN — Medication 25 MILLIGRAM(S): at 22:50

## 2020-03-06 RX ADMIN — Medication 500 MILLIGRAM(S): at 05:09

## 2020-03-06 RX ADMIN — OXYCODONE HYDROCHLORIDE 5 MILLIGRAM(S): 5 TABLET ORAL at 19:53

## 2020-03-06 RX ADMIN — OXYCODONE HYDROCHLORIDE 5 MILLIGRAM(S): 5 TABLET ORAL at 15:31

## 2020-03-06 RX ADMIN — OXYCODONE HYDROCHLORIDE 5 MILLIGRAM(S): 5 TABLET ORAL at 23:50

## 2020-03-06 RX ADMIN — Medication 30 MILLIGRAM(S): at 11:51

## 2020-03-06 RX ADMIN — SIMETHICONE 80 MILLIGRAM(S): 80 TABLET, CHEWABLE ORAL at 22:50

## 2020-03-06 RX ADMIN — Medication 30 MILLIGRAM(S): at 11:36

## 2020-03-06 RX ADMIN — Medication 975 MILLIGRAM(S): at 09:17

## 2020-03-06 RX ADMIN — Medication 100 MILLIGRAM(S): at 05:34

## 2020-03-06 RX ADMIN — Medication 600 MILLIGRAM(S): at 22:27

## 2020-03-06 RX ADMIN — Medication 30 MILLIGRAM(S): at 05:08

## 2020-03-06 RX ADMIN — OXYCODONE HYDROCHLORIDE 5 MILLIGRAM(S): 5 TABLET ORAL at 10:17

## 2020-03-06 NOTE — PROGRESS NOTE ADULT - SUBJECTIVE AND OBJECTIVE BOX
INDERJIT ANNE is a 30y  s/p pCS for arrest of dilation and cat II tracing now POD1 of a viable male infant.     SUBJECTIVE:  No acute events overnight, patient has no complaints.  Pain is well controlled with PRN pain medication.  She is ambulating, voiding, tolerating PO. Denies N/V  ** flatus, ** BM.  ** lochia.    OBJECTIVE:  Physical exam:  General: AOx3, NAD.  Abdomen: +BS, Soft, appropriately tender to palpitation, firm uterine fundus at umbilicus. Incision is clean dry and intact.  Ext: No DVT signs, warm extremities.    Vital Signs Last 24 Hrs  T(C): 36.9 (06 Mar 2020 00:24), Max: 37.0 (05 Mar 2020 09:03)  T(F): 98.5 (06 Mar 2020 00:24), Max: 98.6 (05 Mar 2020 09:03)  HR: 80 (06 Mar 2020 00:24) (80 - 121)  BP: 91/58 (06 Mar 2020 00:24) (90/64 - 147/78)  BP(mean): --  RR: 20 (06 Mar 2020 00:24) (15 - 22)  SpO2: 97% (06 Mar 2020 00:24) (95% - 100%)    LABS:                        11.9   13.76 )-----------( 229      ( 05 Mar 2020 00:22 )             36.7       A/P: Patient is a 30y G ** P ** s/p ** CS now POD ** -- doing well postpartum  - Stable  - Hgb ***  - Pain: well controlled on PO pain meds  - GI: regular diet  - : voiding  - DVT ppx: ***  - Continue routine postop care  - Dispo: POD3, unless otherwise specified INDERJIT ANNE is a 30y  s/p pCS for arrest of dilation and cat II tracing now POD1 of a viable male infant.     SUBJECTIVE:  No acute events overnight, patient complains of pain which is limiting her ability to ambulate.  She is tolerating PO. Denies N/V. Mansfield catheter in place.  +flatus, -BM.  minimal lochia.    OBJECTIVE:  Physical exam:  General: AOx3, NAD.  Abdomen: +BS, Soft, appropriately tender to palpitation, firm uterine fundus at umbilicus. Incision is clean dry and intact.  Ext: No DVT signs, warm extremities.    Vital Signs Last 24 Hrs  T(C): 36.9 (06 Mar 2020 00:24), Max: 37.0 (05 Mar 2020 09:03)  T(F): 98.5 (06 Mar 2020 00:24), Max: 98.6 (05 Mar 2020 09:03)  HR: 80 (06 Mar 2020 00:24) (80 - 121)  BP: 91/58 (06 Mar 2020 00:24) (90/64 - 147/78)  RR: 20 (06 Mar 2020 00:24) (15 - 22)  SpO2: 97% (06 Mar 2020 00:24) (95% - 100%)    LABS:                        11.9   13.76 )-----------( 229      ( 05 Mar 2020 00:22 )             36.7 INDERJIT ANNE is a 30y  s/p pCS for arrest of dilation and cat II tracing now POD1 of a viable male infant.     SUBJECTIVE:  No acute events overnight, patient complains of pain which is limiting her ability to ambulate.  She is tolerating PO. Denies N/V. Mansfield catheter in place.  +flatus, -BM.  minimal lochia.    OBJECTIVE:  Physical exam:  General: AOx3, NAD.  Abdomen: +BS, Soft, appropriately tender to palpitation, firm uterine fundus at umbilicus. Dressing removed, incision is clean dry and intact.  Ext: No DVT signs, warm extremities.    Vital Signs Last 24 Hrs  T(C): 36.9 (06 Mar 2020 00:24), Max: 37.0 (05 Mar 2020 09:03)  T(F): 98.5 (06 Mar 2020 00:24), Max: 98.6 (05 Mar 2020 09:03)  HR: 80 (06 Mar 2020 00:24) (80 - 121)  BP: 91/58 (06 Mar 2020 00:24) (90/64 - 147/78)  RR: 20 (06 Mar 2020 00:24) (15 - 22)  SpO2: 97% (06 Mar 2020 00:24) (95% - 100%)    LABS:                        11.9   13.76 )-----------( 229      ( 05 Mar 2020 00:22 )             36.7

## 2020-03-06 NOTE — PROGRESS NOTE ADULT - ASSESSMENT
A/P: INDERJIT ANNE is a 30y  s/p pCS for arrest of dilation and cat II tracing now POD1-- doing well postpartum  - Stable  - Hgb 11.9 -> 8.4 postop  - Pain: well controlled on PO pain meds  - GI: regular diet  - : wilson in place, to be d/c this AM  - DVT ppx: lovenox  - Continue routine postop care  - Dispo: POD3, unless otherwise specified A/P: INDERJIT ANNE is a 30y  s/p pCS for arrest of dilation and cat II tracing now POD1-- doing well postpartum  - Stable  - Hgb 11.9 -> 8.4 postop  - Pain: Oxycodone this AM for pain, encouraged ambulation  - GI: regular diet  - : wilson in place, to be d/c this AM  - DVT ppx: lovenox  - Continue routine postop care  - Dispo: POD3, unless otherwise specified

## 2020-03-07 ENCOUNTER — TRANSCRIPTION ENCOUNTER (OUTPATIENT)
Age: 30
End: 2020-03-07

## 2020-03-07 PROCEDURE — 99251: CPT

## 2020-03-07 RX ORDER — FERROUS SULFATE 325(65) MG
325 TABLET ORAL DAILY
Refills: 0 | Status: DISCONTINUED | OUTPATIENT
Start: 2020-03-07 | End: 2020-03-08

## 2020-03-07 RX ORDER — HYDROMORPHONE HYDROCHLORIDE 2 MG/ML
4 INJECTION INTRAMUSCULAR; INTRAVENOUS; SUBCUTANEOUS ONCE
Refills: 0 | Status: DISCONTINUED | OUTPATIENT
Start: 2020-03-07 | End: 2020-03-07

## 2020-03-07 RX ORDER — SIMETHICONE 80 MG/1
1 TABLET, CHEWABLE ORAL
Qty: 0 | Refills: 0 | DISCHARGE
Start: 2020-03-07

## 2020-03-07 RX ORDER — DOCUSATE SODIUM 100 MG
1 CAPSULE ORAL
Qty: 60 | Refills: 0
Start: 2020-03-07 | End: 2020-04-05

## 2020-03-07 RX ORDER — ACETAMINOPHEN 500 MG
3 TABLET ORAL
Qty: 0 | Refills: 0 | DISCHARGE
Start: 2020-03-07

## 2020-03-07 RX ORDER — FERROUS SULFATE 325(65) MG
1 TABLET ORAL
Qty: 60 | Refills: 0
Start: 2020-03-07 | End: 2020-04-05

## 2020-03-07 RX ORDER — METOCLOPRAMIDE HCL 10 MG
5 TABLET ORAL ONCE
Refills: 0 | Status: DISCONTINUED | OUTPATIENT
Start: 2020-03-07 | End: 2020-03-08

## 2020-03-07 RX ADMIN — Medication 600 MILLIGRAM(S): at 05:04

## 2020-03-07 RX ADMIN — OXYCODONE HYDROCHLORIDE 5 MILLIGRAM(S): 5 TABLET ORAL at 09:45

## 2020-03-07 RX ADMIN — OXYCODONE HYDROCHLORIDE 5 MILLIGRAM(S): 5 TABLET ORAL at 00:30

## 2020-03-07 RX ADMIN — OXYCODONE HYDROCHLORIDE 5 MILLIGRAM(S): 5 TABLET ORAL at 13:40

## 2020-03-07 RX ADMIN — OXYCODONE HYDROCHLORIDE 5 MILLIGRAM(S): 5 TABLET ORAL at 12:43

## 2020-03-07 RX ADMIN — OXYCODONE HYDROCHLORIDE 5 MILLIGRAM(S): 5 TABLET ORAL at 18:20

## 2020-03-07 RX ADMIN — HYDROMORPHONE HYDROCHLORIDE 4 MILLIGRAM(S): 2 INJECTION INTRAMUSCULAR; INTRAVENOUS; SUBCUTANEOUS at 15:27

## 2020-03-07 RX ADMIN — OXYCODONE HYDROCHLORIDE 5 MILLIGRAM(S): 5 TABLET ORAL at 07:40

## 2020-03-07 RX ADMIN — MAGNESIUM HYDROXIDE 30 MILLILITER(S): 400 TABLET, CHEWABLE ORAL at 21:55

## 2020-03-07 RX ADMIN — OXYCODONE HYDROCHLORIDE 5 MILLIGRAM(S): 5 TABLET ORAL at 22:40

## 2020-03-07 RX ADMIN — OXYCODONE HYDROCHLORIDE 5 MILLIGRAM(S): 5 TABLET ORAL at 03:17

## 2020-03-07 RX ADMIN — OXYCODONE HYDROCHLORIDE 5 MILLIGRAM(S): 5 TABLET ORAL at 10:45

## 2020-03-07 RX ADMIN — Medication 600 MILLIGRAM(S): at 05:35

## 2020-03-07 RX ADMIN — OXYCODONE HYDROCHLORIDE 5 MILLIGRAM(S): 5 TABLET ORAL at 04:00

## 2020-03-07 RX ADMIN — HYDROMORPHONE HYDROCHLORIDE 4 MILLIGRAM(S): 2 INJECTION INTRAMUSCULAR; INTRAVENOUS; SUBCUTANEOUS at 16:14

## 2020-03-07 RX ADMIN — OXYCODONE HYDROCHLORIDE 5 MILLIGRAM(S): 5 TABLET ORAL at 21:56

## 2020-03-07 RX ADMIN — ENOXAPARIN SODIUM 40 MILLIGRAM(S): 100 INJECTION SUBCUTANEOUS at 12:53

## 2020-03-07 RX ADMIN — OXYCODONE HYDROCHLORIDE 5 MILLIGRAM(S): 5 TABLET ORAL at 17:23

## 2020-03-07 RX ADMIN — Medication 25 MILLIGRAM(S): at 05:16

## 2020-03-07 RX ADMIN — OXYCODONE HYDROCHLORIDE 5 MILLIGRAM(S): 5 TABLET ORAL at 06:43

## 2020-03-07 RX ADMIN — MAGNESIUM HYDROXIDE 30 MILLILITER(S): 400 TABLET, CHEWABLE ORAL at 17:22

## 2020-03-07 NOTE — DISCHARGE NOTE OB - PATIENT PORTAL LINK FT
You can access the FollowMyHealth Patient Portal offered by [facility] by registering at the following website: [email or web address]. By joining [employer]’s FollowMyHealth portal, you will also be able to view your health information using other applications (apps) compatible with our system.

## 2020-03-07 NOTE — PROGRESS NOTE ADULT - PROBLEM SELECTOR PLAN 1
- Stable  - Hgb 11.9 -> 8.4 postop  - Pain: Oxycodone this AM for pain, encouraged ambulation  - GI: regular diet  - : Patient urinating spontaneously  - DVT ppx: lovenox QD  - Continue routine postop care

## 2020-03-07 NOTE — CHART NOTE - NSCHARTNOTEFT_GEN_A_CORE
Patient seen and evaluated at bedside with Dr. Vela after complaints of persistent abdominal pain    S:    Patient states that since last night, she has been experiencing constant incisional site pain.  She is describing it as a >10/10 throbbing pain that has been unrelieved with PRN pain meds.  She states that she cannot tolerate ibuprofen and Tylenol because it causes GI distress.  She states that the only medication that has alleviated her pain has been the percocet, but it only alleviates the pain for 30min at a time.  She states that she has been unable to tolerate PO diet secondary to the nausea and abdominal pain.  Otherwise, she is ambulating, passing flatus and urinating spontaneously.    O:    T(C): 36.9 (20 @ 09:17), Max: 36.9 (20 @ 09:17)  HR: 91 (20 @ 09:17) (91 - 102)  BP: 111/74 (20 @ 09:17) (111/74 - 129/83)  RR: 20 (20 @ 09:17) (20 - 24)  SpO2: 98% (20 @ 22:46) (98% - 98%)      Breast: Nontender, not engorged   Abdomen: Obese abdomen. Soft, TTP on light palpation along incision site. non-distended, +bowel sounds.  Uterus:  Fundus firm below umbilicus  Incision: Staples in place. Clean/dry/intact  VE:  +lochia  Ext:  Non-tender, no edema                           8.4    14.56 )-----------( 166      ( 06 Mar 2020 06:33 )             26.5         INDERJIT ANNE is a 30y  s/p pCS for arrest of dilation and cat II tracing now POD2 of a viable male infant. Overnight, patient began experiencing abdominal pain. Based on physical exam findings, very likely pain is secondary to gas.     Plan:  - VSS  - No signs of an acute abdomen on exam. Abdomen soft, TTP along incision site. Incision site clean/dry/intact  - Will discontinue oxycodone at this time. Will change medication over to dilaudid 4mg stat --> 2mg q8hr  - Will provide reglan IVP for nausea/vomiting  - Encouraged continued ambulation  - Will continue to monitor    Plan d/w Dr. Vela

## 2020-03-07 NOTE — PROGRESS NOTE ADULT - ASSESSMENT
INDERJIT ANNE is a 30y  s/p pCS for arrest of dilation and cat II tracing now POD2 of a viable male infant. Overnight, patient began experiencing abdominal pain. Based on physical exam findings, very likely pain is secondary to gas. Patient encouraged to ambulate this AM and will attempt to provide tea and simethicone to aid with distention.

## 2020-03-07 NOTE — DISCHARGE NOTE OB - CARE PLAN
Principal Discharge DX:	 delivery delivered  Goal:	Rapid recovery  Assessment and plan of treatment:	Patient should transition to regular activity level. Resume regular diet. Patient should follow up with her OB for a postpartum checkup 1 week after delivery for staple removal. Patient should call her doctor sooner if she develops a fever or uncontrolled vaginal bleeding or fevers. Please call sooner if there are any other concerns.

## 2020-03-07 NOTE — PROGRESS NOTE ADULT - SUBJECTIVE AND OBJECTIVE BOX
INDERJIT ANNE is a 30y  s/p pCS for arrest of dilation and cat II tracing now POD2 of a viable male infant.       S:    Patient seen and examined at bedside this AM.  Patient states that overnight, she began experiencing global abdominal pain. Describes it as a pressure like pain that is not alleviated with PRN pain medication or with positional change. In addition, she states that at times, the pain sometimes radiates to her shoulders.  She has not ambulated overnight.   + flatus/-BM/ Urinating spontaneously    O:    T(C): 36.8 (20 @ 22:46), Max: 36.8 (20 @ 22:46)  HR: 102 (20 @ 22:46) (80 - 102)  BP: 129/83 (20 @ 22:46) (101/63 - 129/83)  RR: 24 (20 @ 22:46) (20 - 24)  SpO2: 98% (20 @ 22:46) (97% - 98%)      Breast: Nontender, not engorged   Abdomen:  Soft, appropriately-tender, non-distended, +bowel sounds.  Uterus:  Fundus firm below umbilicus  Incision: Staples in place. Clean/dry/intact  VE:  +lochia  Ext:  Non-tender, no edema                           8.4    14.56 )-----------( 166      ( 06 Mar 2020 06:33 )             26.5

## 2020-03-07 NOTE — DISCHARGE NOTE OB - MATERIALS PROVIDED
Shaken Baby Prevention Handout/Breastfeeding Guide and Packet/MMR Vaccination (VIS Pub Date: 2012)/Vaccinations/[facility] Screening Program/Back To Sleep Handout/Breastfeeding Mother’s Support Group Information/Guide to Postpartum Care/Birth Certificate Instructions/Discharge Medication Information for Patients and Families Pocket Guide/[facility] Hearing Screen Program/Tdap Vaccination (VIS Pub Date: 2012)/  Immunization Record/Breastfeeding Log

## 2020-03-07 NOTE — DISCHARGE NOTE OB - CARE PROVIDER_API CALL
Errol Vela)  Obstetrics and Gynecology  [address], [address]  [place] [address]  Phone: [phone]  Fax: [phone]  Follow Up Time:

## 2020-03-07 NOTE — DISCHARGE NOTE OB - MEDICATION SUMMARY - MEDICATIONS TO TAKE
I will START or STAY ON the medications listed below when I get home from the hospital:    acetaminophen 325 mg oral tablet  -- 3 tab(s) by mouth   -- Indication: For PAIN    ferrous sulfate 325 mg (65 mg elemental iron) oral delayed release tablet  -- 1 tab(s) by mouth 2 times a day   -- May discolor urine or feces.  Swallow whole.  Do not crush.    -- Indication: For ANEMIA    docusate sodium 100 mg oral tablet  -- 1 tab(s) by mouth 2 times a day   -- Medication should be taken with plenty of water.    -- Indication: For CONSTIPATION    simethicone 80 mg oral tablet, chewable  -- 1 tab(s) by mouth every 4 hours, As needed, Gas  -- Indication: For GAS PAIN

## 2020-03-07 NOTE — DISCHARGE NOTE OB - PLAN OF CARE
Rapid recovery Patient should transition to regular activity level. Resume regular diet. Patient should follow up with her OB for a postpartum checkup 1 week after delivery for staple removal. Patient should call her doctor sooner if she develops a fever or uncontrolled vaginal bleeding or fevers. Please call sooner if there are any other concerns.

## 2020-03-08 VITALS
SYSTOLIC BLOOD PRESSURE: 118 MMHG | RESPIRATION RATE: 20 BRPM | TEMPERATURE: 98 F | DIASTOLIC BLOOD PRESSURE: 81 MMHG | HEART RATE: 87 BPM

## 2020-03-08 PROCEDURE — 59050 FETAL MONITOR W/REPORT: CPT

## 2020-03-08 PROCEDURE — 85027 COMPLETE CBC AUTOMATED: CPT

## 2020-03-08 PROCEDURE — 86901 BLOOD TYPING SEROLOGIC RH(D): CPT

## 2020-03-08 PROCEDURE — 59025 FETAL NON-STRESS TEST: CPT

## 2020-03-08 PROCEDURE — 86900 BLOOD TYPING SEROLOGIC ABO: CPT

## 2020-03-08 PROCEDURE — 36415 COLL VENOUS BLD VENIPUNCTURE: CPT

## 2020-03-08 PROCEDURE — 81001 URINALYSIS AUTO W/SCOPE: CPT

## 2020-03-08 PROCEDURE — 86850 RBC ANTIBODY SCREEN: CPT

## 2020-03-08 PROCEDURE — 86780 TREPONEMA PALLIDUM: CPT

## 2020-03-08 PROCEDURE — G0463: CPT

## 2020-03-08 RX ADMIN — Medication 600 MILLIGRAM(S): at 00:57

## 2020-03-08 RX ADMIN — Medication 600 MILLIGRAM(S): at 13:05

## 2020-03-08 RX ADMIN — OXYCODONE HYDROCHLORIDE 5 MILLIGRAM(S): 5 TABLET ORAL at 08:34

## 2020-03-08 RX ADMIN — OXYCODONE HYDROCHLORIDE 5 MILLIGRAM(S): 5 TABLET ORAL at 06:15

## 2020-03-08 RX ADMIN — OXYCODONE HYDROCHLORIDE 5 MILLIGRAM(S): 5 TABLET ORAL at 05:22

## 2020-03-08 RX ADMIN — Medication 600 MILLIGRAM(S): at 01:30

## 2020-03-08 RX ADMIN — OXYCODONE HYDROCHLORIDE 5 MILLIGRAM(S): 5 TABLET ORAL at 09:30

## 2020-03-08 RX ADMIN — ENOXAPARIN SODIUM 40 MILLIGRAM(S): 100 INJECTION SUBCUTANEOUS at 12:32

## 2020-03-08 RX ADMIN — Medication 600 MILLIGRAM(S): at 12:32

## 2020-03-08 NOTE — PROGRESS NOTE ADULT - PROBLEM SELECTOR PLAN 1
- Stable  - Hgb 11.9 -> 8.4   - Pain: Dilaudid this AM for pain, encouraged ambulation  - GI: regular diet  - : Patient urinating spontaneously  - DVT ppx: lovenox QD  - Continue routine postop care.   - Plan for discharge this AM pending attending assessment

## 2020-03-08 NOTE — PROGRESS NOTE ADULT - ASSESSMENT
INDERJIT DAYANA is a 30y  s/p pCS for arrest of dilation and cat II tracing now POD3 of a viable male infant. No acute overnight events

## 2020-03-08 NOTE — PROGRESS NOTE ADULT - SUBJECTIVE AND OBJECTIVE BOX
INDERJIT ANNE is a 30y  s/p pCS for arrest of dilation and cat II tracing now POD3 of a viable male infant.     S:    Patient seen and examined at bedside.  Patient reporting large improvement in pain after receiving Dilaudid.   She is doing well, has no complaints. Pain controlled with medication   She is ambulating without difficulty and tolerating PO.   + flatus/-BM   Otherwise, no additional complaints    O:    T(C): 37.5 (20 @ 20:01), Max: 37.5 (20 @ 20:01)  HR: 92 (20 @ 20:01) (91 - 92)  BP: 124/81 (20 @ 20:01) (111/74 - 124/81)  RR: 20 (20 @ 20:01) (20 - 20)  SpO2: 97% (20 @ 20:01) (97% - 97%)      Breast: Nontender, not engorged   Abdomen:  Soft, appropriately-tender, non-distended, +bowel sounds.  Uterus:  Fundus firm below umbilicus  Incision: Staples in place. Clean/dry/intact  VE:  +lochia  Ext:  Non-tender, no edema                           8.4    14.56 )-----------( 166      ( 06 Mar 2020 06:33 )             26.5

## 2020-03-16 ENCOUNTER — APPOINTMENT (OUTPATIENT)
Dept: OBGYN | Facility: CLINIC | Age: 30
End: 2020-03-16
Payer: MEDICAID

## 2020-03-16 VITALS — DIASTOLIC BLOOD PRESSURE: 74 MMHG | WEIGHT: 246 LBS | SYSTOLIC BLOOD PRESSURE: 118 MMHG

## 2020-03-16 DIAGNOSIS — Z78.9 OTHER SPECIFIED HEALTH STATUS: ICD-10-CM

## 2020-03-16 DIAGNOSIS — Z87.09 PERSONAL HISTORY OF OTHER DISEASES OF THE RESPIRATORY SYSTEM: ICD-10-CM

## 2020-03-16 DIAGNOSIS — Z80.9 FAMILY HISTORY OF MALIGNANT NEOPLASM, UNSPECIFIED: ICD-10-CM

## 2020-03-16 PROBLEM — Z00.00 ENCOUNTER FOR PREVENTIVE HEALTH EXAMINATION: Status: ACTIVE | Noted: 2020-03-16

## 2020-03-16 PROCEDURE — 99214 OFFICE O/P EST MOD 30 MIN: CPT

## 2020-03-16 NOTE — HISTORY OF PRESENT ILLNESS
[Lower-Rt-Q] : lower right quadrant [Suprapubic] : suprapubic area [Lower-Lt-Q] : left lower quadrant [Burning] : no burning [Continuous] : continuous [Sharp] : sharp [Throbbing] : no throbbing [3/10] : is 3/10 in severity [Fever] : no fever [Nausea] : no nausea [Vomiting] : no vomiting [Diarrhea] : no diarrhea [Vaginal Discharge] : no vaginal discharge [Vaginal Bleeding] : no vaginal bleeding [Pelvic Pressure] : no pelvic pressure [Dysuria] : no dysuria [Pain with BMs] : no pain with bowel movements [Dysmenorrhea] : no dysmenorrhea [Heat] : improved by heat [Non-opiod Analgesic] : improved by non-opiod analgesic [Opiod Analgesic] : not improved by opiod analgesic [Rest] : improved by rest [Activity] : worsened by activity [Defecation] : not worsened by defecation [Emotional Stress] : not worsened by emotional stress [[place]] : not worsened by intercourse [Lifting] : not worsened by lifting [Menses] : not worsened by menses [Movement] : not worsened by movement [Urination] : not worsened by urination [Early Pregnancy] : not pregnant [Excessive Physical Activity] : no excessive physical activity [Pelvic Trauma] : no pelvic trauma [STDs] : no sexually transmitted disease [Current IUD] : not currently using an IUD [Previous IUD] : no history of IUD use [Appendicitis] : no history of appendicitis [Cholelithiasis] : no history of cholelithiasis [Crohn's Disease] : no history of Crohn's disease [Diverticular Disease] : no history of Diverticular disease [Intrauterine Pregnancy] : no history of intrauterine pregnancy [Nephrolithiasis] : no history of nephrolithiasis [Ovarian Mass] : no history of ovarian mass [Ovarian Torsion] : no history of ovarian torsion

## 2020-04-13 ENCOUNTER — APPOINTMENT (OUTPATIENT)
Dept: OBGYN | Facility: CLINIC | Age: 30
End: 2020-04-13
Payer: COMMERCIAL

## 2020-04-13 VITALS
HEIGHT: 63 IN | SYSTOLIC BLOOD PRESSURE: 117 MMHG | BODY MASS INDEX: 45.36 KG/M2 | DIASTOLIC BLOOD PRESSURE: 74 MMHG | WEIGHT: 256 LBS

## 2020-04-13 DIAGNOSIS — R10.9 UNSPECIFIED ABDOMINAL PAIN: ICD-10-CM

## 2020-04-13 PROCEDURE — 99214 OFFICE O/P EST MOD 30 MIN: CPT

## 2020-04-14 LAB
C TRACH RRNA SPEC QL NAA+PROBE: NOT DETECTED
HPV HIGH+LOW RISK DNA PNL CVX: NOT DETECTED
N GONORRHOEA RRNA SPEC QL NAA+PROBE: NOT DETECTED
SOURCE TP AMPLIFICATION: NORMAL

## 2020-04-15 LAB — CYTOLOGY CVX/VAG DOC THIN PREP: NORMAL

## 2023-09-19 NOTE — OB RN TRIAGE NOTE - COMFORT/ACCEPTABLE PAIN LEVEL (0-10)
0 Opioid Counseling: I discussed with the patient the potential side effects of opioids including but not limited to addiction, altered mental status, and depression. I stressed avoiding alcohol, benzodiazepines, muscle relaxants and sleep aids unless specifically okayed by a physician. The patient verbalized understanding of the proper use and possible adverse effects of opioids. All of the patient's questions and concerns were addressed. They were instructed to flush the remaining pills down the toilet if they did not need them for pain.

## 2024-05-04 NOTE — OB PROVIDER H&P - ASSESSMENT
Alert and oriented to person, place and time INDERJIT ANNE is a 30y  at 40w2d who presented to L&D in labor and with ruptured membranes.     - CBC, type and screen, ABO confirmation, urinalysis, RPR  - no GBS prophylaxis required  - consent  - epidural for pain management    d/w Dr. Farris